# Patient Record
Sex: MALE | Race: OTHER | HISPANIC OR LATINO | URBAN - METROPOLITAN AREA
[De-identification: names, ages, dates, MRNs, and addresses within clinical notes are randomized per-mention and may not be internally consistent; named-entity substitution may affect disease eponyms.]

---

## 2017-10-19 ENCOUNTER — EMERGENCY (EMERGENCY)
Facility: HOSPITAL | Age: 57
LOS: 1 days | Discharge: HOME | End: 2017-10-19
Attending: EMERGENCY MEDICINE
Payer: MEDICAID

## 2017-10-19 VITALS
TEMPERATURE: 97 F | WEIGHT: 147.93 LBS | RESPIRATION RATE: 16 BRPM | SYSTOLIC BLOOD PRESSURE: 137 MMHG | DIASTOLIC BLOOD PRESSURE: 83 MMHG | HEART RATE: 64 BPM | OXYGEN SATURATION: 97 %

## 2017-10-19 DIAGNOSIS — T22.219A BURN OF SECOND DEGREE OF UNSPECIFIED FOREARM, INITIAL ENCOUNTER: ICD-10-CM
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
## 2017-10-19 DIAGNOSIS — T23.271A BURN OF SECOND DEGREE OF RIGHT WRIST, INITIAL ENCOUNTER: ICD-10-CM
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
## 2017-10-19 DIAGNOSIS — T31.0 BURNS INVOLVING LESS THAN 10% OF BODY SURFACE: ICD-10-CM

## 2017-10-19 PROCEDURE — 99284 EMERGENCY DEPT VISIT MOD MDM: CPT

## 2017-10-19 PROCEDURE — 99283 EMERGENCY DEPT VISIT LOW MDM: CPT

## 2017-10-19 RX ORDER — FLUORESCEIN SODIUM 9 MG
1 STRIP OPHTHALMIC (EYE) ONCE
Qty: 0 | Refills: 0 | Status: COMPLETED | OUTPATIENT
Start: 2017-10-19 | End: 2017-10-19

## 2017-10-19 RX ORDER — CIPROFLOXACIN HCL 0.3 %
1 DROPS OPHTHALMIC (EYE) ONCE
Qty: 0 | Refills: 0 | Status: COMPLETED | OUTPATIENT
Start: 2017-10-19 | End: 2017-10-19

## 2017-10-19 RX ADMIN — Medication 1 APPLICATION(S): at 21:40

## 2017-10-19 RX ADMIN — Medication 1 DROP(S): at 21:51

## 2017-10-19 RX ADMIN — Medication 1 APPLICATION(S): at 21:51

## 2017-10-19 RX ADMIN — Medication 1 DROP(S): at 21:40
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
## 2017-10-19 NOTE — ED PROVIDER NOTE - MUSCULOSKELETAL, MLM
Spine appears normal, range of motion is not limited, no muscle or joint tenderness, Distal motor, sensory, and pulses intact.
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
## 2017-10-22 ENCOUNTER — EMERGENCY (EMERGENCY)
Facility: HOSPITAL | Age: 57
LOS: 1 days | Discharge: HOME | End: 2017-10-22
Attending: EMERGENCY MEDICINE
Payer: SELF-PAY

## 2017-10-22 VITALS
HEART RATE: 78 BPM | TEMPERATURE: 98 F | HEIGHT: 67 IN | DIASTOLIC BLOOD PRESSURE: 91 MMHG | OXYGEN SATURATION: 98 % | SYSTOLIC BLOOD PRESSURE: 121 MMHG | WEIGHT: 145.06 LBS | RESPIRATION RATE: 16 BRPM
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
## 2017-10-22 DIAGNOSIS — T23.001D BURN OF UNSPECIFIED DEGREE OF RIGHT HAND, UNSPECIFIED SITE, SUBSEQUENT ENCOUNTER: ICD-10-CM
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
## 2017-10-22 DIAGNOSIS — Z48.00 ENCOUNTER FOR CHANGE OR REMOVAL OF NONSURGICAL WOUND DRESSING: ICD-10-CM

## 2017-10-22 PROCEDURE — 99284 EMERGENCY DEPT VISIT MOD MDM: CPT

## 2017-10-22 PROCEDURE — 99283 EMERGENCY DEPT VISIT LOW MDM: CPT
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
## 2017-10-22 NOTE — ED PROVIDER NOTE - MEDICAL DECISION MAKING DETAILS
58 y/o M pt ran out of Silvadene cream. Will re-order Silvadene cream and d/c home. No need for Abx's.
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
## 2018-07-26 ENCOUNTER — INPATIENT (INPATIENT)
Facility: HOSPITAL | Age: 58
LOS: 2 days | Discharge: HOME | DRG: 416 | End: 2018-07-29
Attending: SURGERY | Admitting: SURGERY
Payer: MEDICAID

## 2018-07-26 ENCOUNTER — TRANSCRIPTION ENCOUNTER (OUTPATIENT)
Age: 58
End: 2018-07-26

## 2018-07-26 VITALS
HEART RATE: 85 BPM | TEMPERATURE: 99 F | RESPIRATION RATE: 20 BRPM | OXYGEN SATURATION: 100 % | DIASTOLIC BLOOD PRESSURE: 86 MMHG | SYSTOLIC BLOOD PRESSURE: 147 MMHG

## 2018-07-26 DIAGNOSIS — K80.00 CALCULUS OF GALLBLADDER WITH ACUTE CHOLECYSTITIS WITHOUT OBSTRUCTION: ICD-10-CM

## 2018-07-26 DIAGNOSIS — E87.6 HYPOKALEMIA: ICD-10-CM

## 2018-07-26 DIAGNOSIS — Z29.9 ENCOUNTER FOR PROPHYLACTIC MEASURES, UNSPECIFIED: ICD-10-CM

## 2018-07-26 DIAGNOSIS — K81.0 ACUTE CHOLECYSTITIS: ICD-10-CM

## 2018-07-26 LAB
ALBUMIN SERPL ELPH-MCNC: 3.4 G/DL — LOW (ref 3.5–5)
ALP SERPL-CCNC: 151 U/L — HIGH (ref 40–120)
ALT FLD-CCNC: 87 U/L DA — HIGH (ref 10–60)
ANION GAP SERPL CALC-SCNC: 6 MMOL/L — SIGNIFICANT CHANGE UP (ref 5–17)
APPEARANCE UR: CLEAR — SIGNIFICANT CHANGE UP
APTT BLD: 30.8 SEC — SIGNIFICANT CHANGE UP (ref 27.5–37.4)
AST SERPL-CCNC: 47 U/L — HIGH (ref 10–40)
BASOPHILS # BLD AUTO: 0 K/UL — SIGNIFICANT CHANGE UP (ref 0–0.2)
BASOPHILS NFR BLD AUTO: 0.3 % — SIGNIFICANT CHANGE UP (ref 0–2)
BILIRUB SERPL-MCNC: 2.6 MG/DL — HIGH (ref 0.2–1.2)
BILIRUB UR-MCNC: NEGATIVE — SIGNIFICANT CHANGE UP
BUN SERPL-MCNC: 8 MG/DL — SIGNIFICANT CHANGE UP (ref 7–18)
CALCIUM SERPL-MCNC: 8.4 MG/DL — SIGNIFICANT CHANGE UP (ref 8.4–10.5)
CHLORIDE SERPL-SCNC: 101 MMOL/L — SIGNIFICANT CHANGE UP (ref 96–108)
CO2 SERPL-SCNC: 30 MMOL/L — SIGNIFICANT CHANGE UP (ref 22–31)
COLOR SPEC: ABNORMAL
CREAT SERPL-MCNC: 0.7 MG/DL — SIGNIFICANT CHANGE UP (ref 0.5–1.3)
DIFF PNL FLD: ABNORMAL
EOSINOPHIL # BLD AUTO: 0 K/UL — SIGNIFICANT CHANGE UP (ref 0–0.5)
EOSINOPHIL NFR BLD AUTO: 0.2 % — SIGNIFICANT CHANGE UP (ref 0–6)
GLUCOSE SERPL-MCNC: 111 MG/DL — HIGH (ref 70–99)
GLUCOSE UR QL: NEGATIVE — SIGNIFICANT CHANGE UP
HCT VFR BLD CALC: 43.7 % — SIGNIFICANT CHANGE UP (ref 39–50)
HGB BLD-MCNC: 15 G/DL — SIGNIFICANT CHANGE UP (ref 13–17)
INR BLD: 1.15 RATIO — SIGNIFICANT CHANGE UP (ref 0.88–1.16)
KETONES UR-MCNC: ABNORMAL
LEUKOCYTE ESTERASE UR-ACNC: ABNORMAL
LIDOCAIN IGE QN: 76 U/L — SIGNIFICANT CHANGE UP (ref 73–393)
LYMPHOCYTES # BLD AUTO: 1.5 K/UL — SIGNIFICANT CHANGE UP (ref 1–3.3)
LYMPHOCYTES # BLD AUTO: 15.2 % — SIGNIFICANT CHANGE UP (ref 13–44)
MCHC RBC-ENTMCNC: 31.9 PG — SIGNIFICANT CHANGE UP (ref 27–34)
MCHC RBC-ENTMCNC: 34.2 GM/DL — SIGNIFICANT CHANGE UP (ref 32–36)
MCV RBC AUTO: 93.3 FL — SIGNIFICANT CHANGE UP (ref 80–100)
MONOCYTES # BLD AUTO: 0.9 K/UL — SIGNIFICANT CHANGE UP (ref 0–0.9)
MONOCYTES NFR BLD AUTO: 8.7 % — SIGNIFICANT CHANGE UP (ref 2–14)
NEUTROPHILS # BLD AUTO: 7.7 K/UL — HIGH (ref 1.8–7.4)
NEUTROPHILS NFR BLD AUTO: 75.6 % — SIGNIFICANT CHANGE UP (ref 43–77)
NITRITE UR-MCNC: NEGATIVE — SIGNIFICANT CHANGE UP
PH UR: 7 — SIGNIFICANT CHANGE UP (ref 5–8)
PLATELET # BLD AUTO: 218 K/UL — SIGNIFICANT CHANGE UP (ref 150–400)
POTASSIUM SERPL-MCNC: 3.1 MMOL/L — LOW (ref 3.5–5.3)
POTASSIUM SERPL-SCNC: 3.1 MMOL/L — LOW (ref 3.5–5.3)
PROT SERPL-MCNC: 7.2 G/DL — SIGNIFICANT CHANGE UP (ref 6–8.3)
PROT UR-MCNC: 30 MG/DL
PROTHROM AB SERPL-ACNC: 12.6 SEC — SIGNIFICANT CHANGE UP (ref 9.8–12.7)
RBC # BLD: 4.69 M/UL — SIGNIFICANT CHANGE UP (ref 4.2–5.8)
RBC # FLD: 11.2 % — SIGNIFICANT CHANGE UP (ref 10.3–14.5)
SODIUM SERPL-SCNC: 137 MMOL/L — SIGNIFICANT CHANGE UP (ref 135–145)
SP GR SPEC: 1.01 — SIGNIFICANT CHANGE UP (ref 1.01–1.02)
UROBILINOGEN FLD QL: 12
WBC # BLD: 10.1 K/UL — SIGNIFICANT CHANGE UP (ref 3.8–10.5)
WBC # FLD AUTO: 10.1 K/UL — SIGNIFICANT CHANGE UP (ref 3.8–10.5)

## 2018-07-26 PROCEDURE — 74177 CT ABD & PELVIS W/CONTRAST: CPT | Mod: 26

## 2018-07-26 PROCEDURE — 93010 ELECTROCARDIOGRAM REPORT: CPT

## 2018-07-26 PROCEDURE — 76705 ECHO EXAM OF ABDOMEN: CPT | Mod: 26

## 2018-07-26 PROCEDURE — 99222 1ST HOSP IP/OBS MODERATE 55: CPT | Mod: 57

## 2018-07-26 PROCEDURE — 99285 EMERGENCY DEPT VISIT HI MDM: CPT

## 2018-07-26 RX ORDER — SODIUM CHLORIDE 9 MG/ML
3 INJECTION INTRAMUSCULAR; INTRAVENOUS; SUBCUTANEOUS ONCE
Qty: 0 | Refills: 0 | Status: COMPLETED | OUTPATIENT
Start: 2018-07-26 | End: 2018-07-26

## 2018-07-26 RX ORDER — HEPARIN SODIUM 5000 [USP'U]/ML
5000 INJECTION INTRAVENOUS; SUBCUTANEOUS EVERY 8 HOURS
Qty: 0 | Refills: 0 | Status: DISCONTINUED | OUTPATIENT
Start: 2018-07-26 | End: 2018-07-27

## 2018-07-26 RX ORDER — ONDANSETRON 8 MG/1
4 TABLET, FILM COATED ORAL EVERY 6 HOURS
Qty: 0 | Refills: 0 | Status: DISCONTINUED | OUTPATIENT
Start: 2018-07-26 | End: 2018-07-29

## 2018-07-26 RX ORDER — KETOROLAC TROMETHAMINE 30 MG/ML
30 SYRINGE (ML) INJECTION ONCE
Qty: 0 | Refills: 0 | Status: DISCONTINUED | OUTPATIENT
Start: 2018-07-26 | End: 2018-07-26

## 2018-07-26 RX ORDER — MORPHINE SULFATE 50 MG/1
2 CAPSULE, EXTENDED RELEASE ORAL EVERY 6 HOURS
Qty: 0 | Refills: 0 | Status: DISCONTINUED | OUTPATIENT
Start: 2018-07-26 | End: 2018-07-29

## 2018-07-26 RX ORDER — ONDANSETRON 8 MG/1
4 TABLET, FILM COATED ORAL ONCE
Qty: 0 | Refills: 0 | Status: COMPLETED | OUTPATIENT
Start: 2018-07-26 | End: 2018-07-26

## 2018-07-26 RX ORDER — SODIUM CHLORIDE 9 MG/ML
1000 INJECTION INTRAMUSCULAR; INTRAVENOUS; SUBCUTANEOUS ONCE
Qty: 0 | Refills: 0 | Status: COMPLETED | OUTPATIENT
Start: 2018-07-26 | End: 2018-07-26

## 2018-07-26 RX ORDER — SODIUM CHLORIDE 9 MG/ML
1000 INJECTION INTRAMUSCULAR; INTRAVENOUS; SUBCUTANEOUS
Qty: 0 | Refills: 0 | Status: DISCONTINUED | OUTPATIENT
Start: 2018-07-26 | End: 2018-07-29

## 2018-07-26 RX ORDER — POTASSIUM CHLORIDE 20 MEQ
40 PACKET (EA) ORAL ONCE
Qty: 0 | Refills: 0 | Status: COMPLETED | OUTPATIENT
Start: 2018-07-26 | End: 2018-07-26

## 2018-07-26 RX ORDER — MORPHINE SULFATE 50 MG/1
4 CAPSULE, EXTENDED RELEASE ORAL ONCE
Qty: 0 | Refills: 0 | Status: DISCONTINUED | OUTPATIENT
Start: 2018-07-26 | End: 2018-07-26

## 2018-07-26 RX ADMIN — Medication 30 MILLIGRAM(S): at 11:11

## 2018-07-26 RX ADMIN — MORPHINE SULFATE 4 MILLIGRAM(S): 50 CAPSULE, EXTENDED RELEASE ORAL at 20:32

## 2018-07-26 RX ADMIN — SODIUM CHLORIDE 3 MILLILITER(S): 9 INJECTION INTRAMUSCULAR; INTRAVENOUS; SUBCUTANEOUS at 11:06

## 2018-07-26 RX ADMIN — Medication 30 MILLIGRAM(S): at 13:00

## 2018-07-26 RX ADMIN — Medication 40 MILLIEQUIVALENT(S): at 16:22

## 2018-07-26 RX ADMIN — SODIUM CHLORIDE 1000 MILLILITER(S): 9 INJECTION INTRAMUSCULAR; INTRAVENOUS; SUBCUTANEOUS at 13:00

## 2018-07-26 RX ADMIN — SODIUM CHLORIDE 1000 MILLILITER(S): 9 INJECTION INTRAMUSCULAR; INTRAVENOUS; SUBCUTANEOUS at 11:11

## 2018-07-26 RX ADMIN — MORPHINE SULFATE 4 MILLIGRAM(S): 50 CAPSULE, EXTENDED RELEASE ORAL at 20:03

## 2018-07-26 RX ADMIN — ONDANSETRON 4 MILLIGRAM(S): 8 TABLET, FILM COATED ORAL at 11:11

## 2018-07-26 NOTE — ED PROVIDER NOTE - OBJECTIVE STATEMENT
58 year old M Pt w/ no PMHx presents to ED c/o periumbilical and RUQ abdominal pain since Sunday night. Pt reports eating pork Sunday afternoon, and that night had sudden onset of abdominal pain lasting 2 hours. Pain spontaneously resolved but recurred Tuesday and Wednesday. Pt reports since last night, pain has been constant, one episode of vomiting. Pt denies fever, chills, sweats, hematuria, dysuria, diarrhea, and all other complaints.

## 2018-07-26 NOTE — H&P ADULT - HISTORY OF PRESENT ILLNESS
57 y/o M with no significant PMH presents with 4 days of 8/10 intermittent abdominal pain in the RUQ first experienced after eating pork this past Sunday and was associated with nausea. Pain has been constant since yesterday with one episode of vomiting. Current pain 3/10. Has not had bowel movement in 4 days. Denies fever, chills, HA, diarrhea, CP, SOB. Denies prior history of gallbladder disease and cholelithiasis.

## 2018-07-26 NOTE — ED PROVIDER NOTE - PHYSICAL EXAMINATION
General: pt lying in stretcher, appears stated age and is not in distress  HEENT: AT/NC, pink conjunctiva, anicteric sclerae, EOMI, PERRLA, TMs smooth, grey, intact, with normal landmarks, nasal mucosa pink, no discharge, turbinates not enlarged; moist mucus membranes, tongue well-papillated, good dentition; posterior pharynx shows no erythema or exudates;   Neck: supple, full ROM, trachea midline, no JVD, no cervical LAD, no midline ttp or stepoffs  Lungs: symmetric excursion, b/l clear vesicular breath sounds with no wheezes, crackles, or rhonchi  Heart: rrr, S1, S2 normal; no S3 or S4; no murmurs or rubs  Abd: normal bowel sounds; soft, nontender; negative McBurney's point tenderness, negative Turner's sign, no rebound, no guarding, spleen non-palpable; no hepatomegaly, no masses  Back: no midline spinal tenderness or stepoffs, no costovertebral angle tenderness  Extremities: no clubbing, cyanosis, or edema; no palpable deformities or fractures  Skin: good turgor; no rashes, petechiae, ecchymoses, or jaundice  Pulses: radial, posterior tibialis (PT), dorsalis pedis (DP) all 2+ & symmetric  Neuro: awake, alert, responsive; oriented to person, place and time; cranial nerves intact, EOMI, intact jaw movement, intact facial sensation, no facial asymmetry, hearing intact; no nystagmus, tongue midline; Motor: Normal tone in upper and lower extremities bilaterally strength 5/5; Sensory: intact to pinprick and light touch; Cerebellar: finger-to-nose intact; normal steady gait; negative Romberg’s sign; DTR: biceps, triceps, patellar, 2+, no pronator drift General: pt lying in stretcher, appears stated age and is uncomfortable secondary to pain  HEENT: AT/NC, pink conjunctiva, anicteric sclerae, EOMI, PERRLA, moist mucus membranes, tongue well-papillated, posterior pharynx shows no erythema or exudates;   Neck: supple, full ROM, trachea midline, no JVD, no cervical LAD, no midline ttp or stepoffs  Lungs: symmetric excursion, b/l clear vesicular breath sounds with no wheezes, crackles, or rhonchi  Heart: rrr, S1, S2 normal; no S3 or S4; no murmurs or rubs  Abd: normal bowel sounds; soft, +RUQ tenderness to palpation w/ guarding, negative McBurney's point tenderness, negative Turner's sign, no rebound, spleen non-palpable; no hepatomegaly, no masses  Back: no midline spinal tenderness or stepoffs, no costovertebral angle tenderness  Extremities: no clubbing, cyanosis, or edema; no palpable deformities or fractures  Skin: good turgor; no rashes, petechiae, ecchymoses, or jaundice  Pulses: radial, posterior tibialis (PT), dorsalis pedis (DP) all 2+ & symmetric  Neuro: awake, alert, responsive; oriented to person, place and time; cranial nerves intact, EOMI, intact jaw movement, intact facial sensation, no facial asymmetry, hearing intact; tongue midline; Motor: Normal tone in upper and lower extremities bilaterally strength 5/5; Sensory: intact; normal steady gait

## 2018-07-26 NOTE — H&P ADULT - NSHPLABSRESULTS_GEN_ALL_CORE
LABS:                        15.0   10.1  )-----------( 218      ( 2018 11:42 )             43.7         137  |  101  |  8   ----------------------------<  111<H>  3.1<L>   |  30  |  0.70    Ca    8.4      2018 11:42    TPro  7.2  /  Alb  3.4<L>  /  TBili  2.6<H>  /  DBili  x   /  AST  47<H>  /  ALT  87<H>  /  AlkPhos  151<H>      Urinalysis Basic - ( 2018 11:42 )    Color: Celestina / Appearance: Clear / S.010 / pH: x  Gluc: x / Ketone: Trace  / Bili: Negative / Urobili: 12   Blood: x / Protein: 30 mg/dL / Nitrite: Negative   Leuk Esterase: Trace / RBC: 2-5 /HPF / WBC 0-2 /HPF   Sq Epi: x / Non Sq Epi: Occasional /HPF / Bacteria: Trace /HPF    RADIOLOGY & ADDITIONAL STUDIES:  < from: CT Abdomen and Pelvis w/ IV Cont (18 @ 12:11) >    IMPRESSION:  Distended gallbladder with mild surrounding fat stranding. No calcified   gallstone. No biliary ductal dilatation. Consider further evaluation with   ultrasound to exclude cholecystitis, as clinically indicated.    Enlarged prostate.    Other findings as above.    < end of copied text >    < from: US Hepatic & Pancreatic (18 @ 16:28) >    Impression: Dependent sludge in the distended gallbladder. 1.5 cm   shadowing gallstone in the gallbladder neck. Slight gallbladder wall  thickening. No evidence for pericholecystic fluid or ultrasonic Turner's   sign. Findings are equivocal for acute cholecystitis. If clinically   indicated, HIDA scan may be pursued for further evaluation.    Hepatic steatosis.    < end of copied text >

## 2018-07-26 NOTE — ED PROVIDER NOTE - MEDICAL DECISION MAKING DETAILS
59 yo m w/ aforementioned presentation concerning for hepatobiliary disease versus electrolyte abnormalities versus infection versus other cause of acute abdomen. will get labs, CT, US, give pain medication, monitor and reassess. Pt pending surgery consult for cholecystitis. Signed out to Dr. Curiel.

## 2018-07-26 NOTE — H&P ADULT - NSHPPHYSICALEXAM_GEN_ALL_CORE
Vital Signs Last 24 Hrs  T(C): 37.4 (26 Jul 2018 16:08), Max: 37.4 (26 Jul 2018 16:08)  T(F): 99.4 (26 Jul 2018 16:08), Max: 99.4 (26 Jul 2018 16:08)  HR: 59 (26 Jul 2018 16:08) (59 - 85)  BP: 127/76 (26 Jul 2018 16:08) (117/71 - 147/86)  RR: 16 (26 Jul 2018 16:08) (16 - 20)  SpO2: 98% (26 Jul 2018 16:08) (98% - 100%)    Physical Exam:    General:  Appears stated age, well-groomed, well-nourished, no distress  Eyes: EOMI  Chest:  Respirations non-labored  Abdomen:  Moderately distended abdomen. Tenderness localized to RUQ and epigastric area. No guarding or rebound tenderness.  Negative Turner's sign.  Skin:  Warm and dry  Neuro:  Alert, oriented to time, place and person  Psych: Normal affect

## 2018-07-26 NOTE — H&P ADULT - PROBLEM SELECTOR PLAN 1
NPO. IV Fluids  Schedule for laparoscopic cholecystectomy tomorrow  preop labs pending  EKG pending  repeat labs/LFT's in AM  pain control PRN, antiemetic PRN

## 2018-07-27 ENCOUNTER — RESULT REVIEW (OUTPATIENT)
Age: 58
End: 2018-07-27

## 2018-07-27 LAB
ALBUMIN SERPL ELPH-MCNC: 3.2 G/DL — LOW (ref 3.5–5)
ALP SERPL-CCNC: 179 U/L — HIGH (ref 40–120)
ALT FLD-CCNC: 78 U/L DA — HIGH (ref 10–60)
ANION GAP SERPL CALC-SCNC: 8 MMOL/L — SIGNIFICANT CHANGE UP (ref 5–17)
AST SERPL-CCNC: 36 U/L — SIGNIFICANT CHANGE UP (ref 10–40)
BILIRUB SERPL-MCNC: 2.5 MG/DL — HIGH (ref 0.2–1.2)
BUN SERPL-MCNC: 12 MG/DL — SIGNIFICANT CHANGE UP (ref 7–18)
CALCIUM SERPL-MCNC: 8.5 MG/DL — SIGNIFICANT CHANGE UP (ref 8.4–10.5)
CHLORIDE SERPL-SCNC: 105 MMOL/L — SIGNIFICANT CHANGE UP (ref 96–108)
CO2 SERPL-SCNC: 25 MMOL/L — SIGNIFICANT CHANGE UP (ref 22–31)
CREAT SERPL-MCNC: 0.68 MG/DL — SIGNIFICANT CHANGE UP (ref 0.5–1.3)
GLUCOSE SERPL-MCNC: 115 MG/DL — HIGH (ref 70–99)
HCT VFR BLD CALC: 43.2 % — SIGNIFICANT CHANGE UP (ref 39–50)
HGB BLD-MCNC: 14.7 G/DL — SIGNIFICANT CHANGE UP (ref 13–17)
MCHC RBC-ENTMCNC: 32.2 PG — SIGNIFICANT CHANGE UP (ref 27–34)
MCHC RBC-ENTMCNC: 34 GM/DL — SIGNIFICANT CHANGE UP (ref 32–36)
MCV RBC AUTO: 94.9 FL — SIGNIFICANT CHANGE UP (ref 80–100)
PLATELET # BLD AUTO: 257 K/UL — SIGNIFICANT CHANGE UP (ref 150–400)
POTASSIUM SERPL-MCNC: 3.8 MMOL/L — SIGNIFICANT CHANGE UP (ref 3.5–5.3)
POTASSIUM SERPL-SCNC: 3.8 MMOL/L — SIGNIFICANT CHANGE UP (ref 3.5–5.3)
PROT SERPL-MCNC: 7.4 G/DL — SIGNIFICANT CHANGE UP (ref 6–8.3)
RBC # BLD: 4.55 M/UL — SIGNIFICANT CHANGE UP (ref 4.2–5.8)
RBC # FLD: 11.4 % — SIGNIFICANT CHANGE UP (ref 10.3–14.5)
SODIUM SERPL-SCNC: 138 MMOL/L — SIGNIFICANT CHANGE UP (ref 135–145)
WBC # BLD: 9.3 K/UL — SIGNIFICANT CHANGE UP (ref 3.8–10.5)
WBC # FLD AUTO: 9.3 K/UL — SIGNIFICANT CHANGE UP (ref 3.8–10.5)

## 2018-07-27 PROCEDURE — 47605 CHOLECYSTECTOMY W/CHOLANG: CPT

## 2018-07-27 PROCEDURE — 88304 TISSUE EXAM BY PATHOLOGIST: CPT | Mod: 26

## 2018-07-27 RX ORDER — ACETAMINOPHEN 500 MG
1000 TABLET ORAL ONCE
Qty: 0 | Refills: 0 | Status: COMPLETED | OUTPATIENT
Start: 2018-07-28 | End: 2018-07-28

## 2018-07-27 RX ORDER — ACETAMINOPHEN 500 MG
1000 TABLET ORAL ONCE
Qty: 0 | Refills: 0 | Status: COMPLETED | OUTPATIENT
Start: 2018-07-27 | End: 2018-07-27

## 2018-07-27 RX ORDER — CEFOTETAN DISODIUM 1 G
2 VIAL (EA) INJECTION EVERY 12 HOURS
Qty: 0 | Refills: 0 | Status: DISCONTINUED | OUTPATIENT
Start: 2018-07-27 | End: 2018-07-29

## 2018-07-27 RX ORDER — HEPARIN SODIUM 5000 [USP'U]/ML
5000 INJECTION INTRAVENOUS; SUBCUTANEOUS EVERY 8 HOURS
Qty: 0 | Refills: 0 | Status: DISCONTINUED | OUTPATIENT
Start: 2018-07-28 | End: 2018-07-29

## 2018-07-27 RX ORDER — OXYCODONE AND ACETAMINOPHEN 5; 325 MG/1; MG/1
1 TABLET ORAL EVERY 4 HOURS
Qty: 0 | Refills: 0 | Status: DISCONTINUED | OUTPATIENT
Start: 2018-07-27 | End: 2018-07-29

## 2018-07-27 RX ORDER — HYDROMORPHONE HYDROCHLORIDE 2 MG/ML
1 INJECTION INTRAMUSCULAR; INTRAVENOUS; SUBCUTANEOUS EVERY 6 HOURS
Qty: 0 | Refills: 0 | Status: DISCONTINUED | OUTPATIENT
Start: 2018-07-27 | End: 2018-07-29

## 2018-07-27 RX ADMIN — Medication 400 MILLIGRAM(S): at 15:27

## 2018-07-27 RX ADMIN — Medication 1000 MILLIGRAM(S): at 21:20

## 2018-07-27 RX ADMIN — HEPARIN SODIUM 5000 UNIT(S): 5000 INJECTION INTRAVENOUS; SUBCUTANEOUS at 00:11

## 2018-07-27 RX ADMIN — Medication 1000 MILLIGRAM(S): at 15:42

## 2018-07-27 RX ADMIN — HEPARIN SODIUM 5000 UNIT(S): 5000 INJECTION INTRAVENOUS; SUBCUTANEOUS at 05:47

## 2018-07-27 RX ADMIN — MORPHINE SULFATE 2 MILLIGRAM(S): 50 CAPSULE, EXTENDED RELEASE ORAL at 17:40

## 2018-07-27 RX ADMIN — HYDROMORPHONE HYDROCHLORIDE 1 MILLIGRAM(S): 2 INJECTION INTRAMUSCULAR; INTRAVENOUS; SUBCUTANEOUS at 18:00

## 2018-07-27 RX ADMIN — HYDROMORPHONE HYDROCHLORIDE 1 MILLIGRAM(S): 2 INJECTION INTRAMUSCULAR; INTRAVENOUS; SUBCUTANEOUS at 17:43

## 2018-07-27 RX ADMIN — MORPHINE SULFATE 2 MILLIGRAM(S): 50 CAPSULE, EXTENDED RELEASE ORAL at 15:58

## 2018-07-27 RX ADMIN — Medication 100 GRAM(S): at 20:52

## 2018-07-27 RX ADMIN — Medication 400 MILLIGRAM(S): at 20:49

## 2018-07-27 RX ADMIN — MORPHINE SULFATE 2 MILLIGRAM(S): 50 CAPSULE, EXTENDED RELEASE ORAL at 05:46

## 2018-07-27 RX ADMIN — MORPHINE SULFATE 2 MILLIGRAM(S): 50 CAPSULE, EXTENDED RELEASE ORAL at 03:29

## 2018-07-27 RX ADMIN — SODIUM CHLORIDE 125 MILLILITER(S): 9 INJECTION INTRAMUSCULAR; INTRAVENOUS; SUBCUTANEOUS at 03:30

## 2018-07-27 NOTE — PROGRESS NOTE ADULT - SUBJECTIVE AND OBJECTIVE BOX
SUBJECTIVE    Nausea [X ] YES [ ] NO  Vomiting [ ] YES [X ] NO  Voiding normally [X ] YES [ ] NO  Flatus [ ] YES X[ ] NO  BM [ ] YES [X ] NO  Pain under control [X ] YES [ ] NO  NPO  Ambulated [X ] YES NO [ ]        VITALS    ICU Vital Signs Last 24 Hrs  T(C): 36.7 (27 Jul 2018 05:35), Max: 37.4 (26 Jul 2018 16:08)  T(F): 98 (27 Jul 2018 05:35), Max: 99.4 (26 Jul 2018 16:08)  HR: 71 (27 Jul 2018 05:35) (59 - 85)  BP: 121/74 (27 Jul 2018 05:35) (117/71 - 147/86)  BP(mean): --  ABP: --  ABP(mean): --  RR: 18 (27 Jul 2018 05:35) (16 - 20)  SpO2: 98% (27 Jul 2018 05:35) (95% - 100%)    I&O's Detail        PHYSICAL EXAMINATION    Abdomen: soft, ND, mildly tender to RUQ but was medicated recently    I&O's Summary      LABS                        15.0   10.1  )-----------( 218      ( 26 Jul 2018 11:42 )             43.7             07-26    137  |  101  |  8   ----------------------------<  111<H>  3.1<L>   |  30  |  0.70    Ca    8.4      26 Jul 2018 11:42    TPro  7.2  /  Alb  3.4<L>  /  TBili  2.6<H>  /  DBili  x   /  AST  47<H>  /  ALT  87<H>  /  AlkPhos  151<H>  07-26    LIVER FUNCTIONS - ( 26 Jul 2018 11:42 )  Alb: 3.4 g/dL / Pro: 7.2 g/dL / ALK PHOS: 151 U/L / ALT: 87 U/L DA / AST: 47 U/L / GGT: x             MEDICATIONS:  MEDICATIONS  (STANDING):  heparin  Injectable 5000 Unit(s) SubCutaneous every 8 hours  sodium chloride 0.9%. 1000 milliLiter(s) (125 mL/Hr) IV Continuous <Continuous>    MEDICATIONS  (PRN):  morphine  - Injectable 2 milliGRAM(s) IV Push every 6 hours PRN Severe Pain (7 - 10)  ondansetron Injectable 4 milliGRAM(s) IV Push every 6 hours PRN Nausea and/or Vomiting

## 2018-07-27 NOTE — PROGRESS NOTE ADULT - ASSESSMENT
58M with acute choplecystitis. Lap josh this admission. Risks, benefits, alternatives discussed, all questions answered, agrees to proceed.    1) continue IVF  2) abx  3) T&S  4) OR

## 2018-07-27 NOTE — PROGRESS NOTE ADULT - ASSESSMENT
S/P open cholecystectomy, IOC    Hemodynamically stable    -OOB, IS  -CLD  -Pain management  -Dressing change in AM  -GI/DVT prophylaxis

## 2018-07-27 NOTE — BRIEF OPERATIVE NOTE - PROCEDURE
<<-----Click on this checkbox to enter Procedure Open cholecystectomy with cholangiography  07/27/2018    Active  CFUNFGELD  Laparoscopy  07/27/2018    Active  CFUNFGELD

## 2018-07-28 LAB
ALBUMIN SERPL ELPH-MCNC: 2.3 G/DL — LOW (ref 3.5–5)
ALP SERPL-CCNC: 156 U/L — HIGH (ref 40–120)
ALT FLD-CCNC: 100 U/L DA — HIGH (ref 10–60)
ANION GAP SERPL CALC-SCNC: 4 MMOL/L — LOW (ref 5–17)
AST SERPL-CCNC: 83 U/L — HIGH (ref 10–40)
BASOPHILS # BLD AUTO: 0 K/UL — SIGNIFICANT CHANGE UP (ref 0–0.2)
BASOPHILS NFR BLD AUTO: 0.5 % — SIGNIFICANT CHANGE UP (ref 0–2)
BILIRUB SERPL-MCNC: 1.3 MG/DL — HIGH (ref 0.2–1.2)
BUN SERPL-MCNC: 11 MG/DL — SIGNIFICANT CHANGE UP (ref 7–18)
CALCIUM SERPL-MCNC: 7.6 MG/DL — LOW (ref 8.4–10.5)
CHLORIDE SERPL-SCNC: 105 MMOL/L — SIGNIFICANT CHANGE UP (ref 96–108)
CO2 SERPL-SCNC: 29 MMOL/L — SIGNIFICANT CHANGE UP (ref 22–31)
CREAT SERPL-MCNC: 0.66 MG/DL — SIGNIFICANT CHANGE UP (ref 0.5–1.3)
EOSINOPHIL # BLD AUTO: 0 K/UL — SIGNIFICANT CHANGE UP (ref 0–0.5)
EOSINOPHIL NFR BLD AUTO: 0.1 % — SIGNIFICANT CHANGE UP (ref 0–6)
GLUCOSE SERPL-MCNC: 97 MG/DL — SIGNIFICANT CHANGE UP (ref 70–99)
HCT VFR BLD CALC: 34.8 % — LOW (ref 39–50)
HGB BLD-MCNC: 11.9 G/DL — LOW (ref 13–17)
LYMPHOCYTES # BLD AUTO: 1.2 K/UL — SIGNIFICANT CHANGE UP (ref 1–3.3)
LYMPHOCYTES # BLD AUTO: 18.2 % — SIGNIFICANT CHANGE UP (ref 13–44)
MCHC RBC-ENTMCNC: 32.3 PG — SIGNIFICANT CHANGE UP (ref 27–34)
MCHC RBC-ENTMCNC: 34.2 GM/DL — SIGNIFICANT CHANGE UP (ref 32–36)
MCV RBC AUTO: 94.5 FL — SIGNIFICANT CHANGE UP (ref 80–100)
MONOCYTES # BLD AUTO: 0.5 K/UL — SIGNIFICANT CHANGE UP (ref 0–0.9)
MONOCYTES NFR BLD AUTO: 7.2 % — SIGNIFICANT CHANGE UP (ref 2–14)
NEUTROPHILS # BLD AUTO: 4.8 K/UL — SIGNIFICANT CHANGE UP (ref 1.8–7.4)
NEUTROPHILS NFR BLD AUTO: 74 % — SIGNIFICANT CHANGE UP (ref 43–77)
PLATELET # BLD AUTO: 210 K/UL — SIGNIFICANT CHANGE UP (ref 150–400)
POTASSIUM SERPL-MCNC: 3.6 MMOL/L — SIGNIFICANT CHANGE UP (ref 3.5–5.3)
POTASSIUM SERPL-SCNC: 3.6 MMOL/L — SIGNIFICANT CHANGE UP (ref 3.5–5.3)
PROT SERPL-MCNC: 5.6 G/DL — LOW (ref 6–8.3)
RBC # BLD: 3.68 M/UL — LOW (ref 4.2–5.8)
RBC # FLD: 11.2 % — SIGNIFICANT CHANGE UP (ref 10.3–14.5)
SODIUM SERPL-SCNC: 138 MMOL/L — SIGNIFICANT CHANGE UP (ref 135–145)
WBC # BLD: 6.5 K/UL — SIGNIFICANT CHANGE UP (ref 3.8–10.5)
WBC # FLD AUTO: 6.5 K/UL — SIGNIFICANT CHANGE UP (ref 3.8–10.5)

## 2018-07-28 RX ADMIN — Medication 400 MILLIGRAM(S): at 13:21

## 2018-07-28 RX ADMIN — Medication 1000 MILLIGRAM(S): at 09:15

## 2018-07-28 RX ADMIN — Medication 1000 MILLIGRAM(S): at 02:35

## 2018-07-28 RX ADMIN — HEPARIN SODIUM 5000 UNIT(S): 5000 INJECTION INTRAVENOUS; SUBCUTANEOUS at 16:00

## 2018-07-28 RX ADMIN — HEPARIN SODIUM 5000 UNIT(S): 5000 INJECTION INTRAVENOUS; SUBCUTANEOUS at 08:56

## 2018-07-28 RX ADMIN — Medication 1000 MILLIGRAM(S): at 13:36

## 2018-07-28 RX ADMIN — Medication 400 MILLIGRAM(S): at 08:57

## 2018-07-28 RX ADMIN — Medication 100 GRAM(S): at 08:57

## 2018-07-28 RX ADMIN — Medication 400 MILLIGRAM(S): at 01:50

## 2018-07-28 RX ADMIN — Medication 100 GRAM(S): at 18:08

## 2018-07-28 NOTE — PROGRESS NOTE ADULT - SUBJECTIVE AND OBJECTIVE BOX
SUBJECTIVE    Nausea [ ] YES [X ] NO  Vomiting [ ] YES [X ] NO  Voiding normally [X ] YES [ ] NO  Flatus [X ] YES [ ] NO  Pain under control [X ] YES [ ] NO  NPO  Ambulated [X ] YES NO [ ]  Using Incentive Spirometer [ ] YES [ ] NO      VITALS    ICU Vital Signs Last 24 Hrs  T(C): 36.7 (28 Jul 2018 06:00), Max: 37 (27 Jul 2018 16:45)  T(F): 98.1 (28 Jul 2018 06:00), Max: 98.6 (27 Jul 2018 16:45)  HR: 68 (28 Jul 2018 06:00) (66 - 81)  BP: 100/63 (28 Jul 2018 06:00) (100/63 - 148/83)  BP(mean): 101 (27 Jul 2018 15:42) (93 - 101)  ABP: --  ABP(mean): --  RR: 18 (28 Jul 2018 06:00) (16 - 18)  SpO2: 100% (28 Jul 2018 06:00) (94% - 100%)    I&O's Detail    27 Jul 2018 07:01  -  28 Jul 2018 07:00  --------------------------------------------------------  IN:    Lactated Ringers IV Bolus: 2000 mL  Total IN: 2000 mL    OUT:    Bulb: 85 mL  Total OUT: 85 mL    Total NET: 1915 mL            PHYSICAL EXAMINATION    Abdomen: soft, appropriately tender to Kocher incision; CASTILLO: serosanguinous    I&O's Summary    27 Jul 2018 07:01  -  28 Jul 2018 07:00  --------------------------------------------------------  IN: 2000 mL / OUT: 85 mL / NET: 1915 mL        LABS                        11.9   6.5   )-----------( 210      ( 28 Jul 2018 05:54 )             34.8             07-28    138  |  105  |  11  ----------------------------<  97  3.6   |  29  |  0.66    Ca    7.6<L>      28 Jul 2018 05:54    TPro  5.6<L>  /  Alb  2.3<L>  /  TBili  1.3<H>  /  DBili  x   /  AST  83<H>  /  ALT  100<H>  /  AlkPhos  156<H>  07-28    LIVER FUNCTIONS - ( 28 Jul 2018 05:54 )  Alb: 2.3 g/dL / Pro: 5.6 g/dL / ALK PHOS: 156 U/L / ALT: 100 U/L DA / AST: 83 U/L / GGT: x             MEDICATIONS:  MEDICATIONS  (STANDING):  acetaminophen  IVPB. 1000 milliGRAM(s) IV Intermittent once  cefoTEtan  IVPB 2 Gram(s) IV Intermittent every 12 hours  heparin  Injectable 5000 Unit(s) SubCutaneous every 8 hours  sodium chloride 0.9%. 1000 milliLiter(s) (125 mL/Hr) IV Continuous <Continuous>    MEDICATIONS  (PRN):  HYDROmorphone  Injectable 1 milliGRAM(s) IV Push every 6 hours PRN Severe Pain (7 - 10)  morphine  - Injectable 2 milliGRAM(s) IV Push every 6 hours PRN Severe Pain (7 - 10)  ondansetron Injectable 4 milliGRAM(s) IV Push every 6 hours PRN Nausea and/or Vomiting  oxyCODONE    5 mG/acetaminophen 325 mG 1 Tablet(s) Oral every 4 hours PRN Moderate Pain

## 2018-07-28 NOTE — PROGRESS NOTE ADULT - ASSESSMENT
58M s./p lap to open cholecystectomy with normal cholangiogram. Afebrile, stable.    1) advance diet  2) continue abx  3) labs acceptable  4) anticipate D/C tomorrow if tolerates diet and labs remain normal

## 2018-07-29 ENCOUNTER — TRANSCRIPTION ENCOUNTER (OUTPATIENT)
Age: 58
End: 2018-07-29

## 2018-07-29 VITALS
SYSTOLIC BLOOD PRESSURE: 137 MMHG | DIASTOLIC BLOOD PRESSURE: 76 MMHG | TEMPERATURE: 98 F | RESPIRATION RATE: 17 BRPM | HEART RATE: 75 BPM | OXYGEN SATURATION: 96 %

## 2018-07-29 LAB
ANION GAP SERPL CALC-SCNC: 7 MMOL/L — SIGNIFICANT CHANGE UP (ref 5–17)
BUN SERPL-MCNC: 7 MG/DL — SIGNIFICANT CHANGE UP (ref 7–18)
CALCIUM SERPL-MCNC: 7.7 MG/DL — LOW (ref 8.4–10.5)
CHLORIDE SERPL-SCNC: 105 MMOL/L — SIGNIFICANT CHANGE UP (ref 96–108)
CO2 SERPL-SCNC: 26 MMOL/L — SIGNIFICANT CHANGE UP (ref 22–31)
CREAT SERPL-MCNC: 0.93 MG/DL — SIGNIFICANT CHANGE UP (ref 0.5–1.3)
GLUCOSE SERPL-MCNC: 103 MG/DL — HIGH (ref 70–99)
HCT VFR BLD CALC: 36.4 % — LOW (ref 39–50)
HGB BLD-MCNC: 12.3 G/DL — LOW (ref 13–17)
MCHC RBC-ENTMCNC: 32.1 PG — SIGNIFICANT CHANGE UP (ref 27–34)
MCHC RBC-ENTMCNC: 33.8 GM/DL — SIGNIFICANT CHANGE UP (ref 32–36)
MCV RBC AUTO: 94.8 FL — SIGNIFICANT CHANGE UP (ref 80–100)
PLATELET # BLD AUTO: 255 K/UL — SIGNIFICANT CHANGE UP (ref 150–400)
POTASSIUM SERPL-MCNC: 3.4 MMOL/L — LOW (ref 3.5–5.3)
POTASSIUM SERPL-SCNC: 3.4 MMOL/L — LOW (ref 3.5–5.3)
RBC # BLD: 3.84 M/UL — LOW (ref 4.2–5.8)
RBC # FLD: 11.1 % — SIGNIFICANT CHANGE UP (ref 10.3–14.5)
SODIUM SERPL-SCNC: 138 MMOL/L — SIGNIFICANT CHANGE UP (ref 135–145)
WBC # BLD: 7.5 K/UL — SIGNIFICANT CHANGE UP (ref 3.8–10.5)
WBC # FLD AUTO: 7.5 K/UL — SIGNIFICANT CHANGE UP (ref 3.8–10.5)

## 2018-07-29 PROCEDURE — 36415 COLL VENOUS BLD VENIPUNCTURE: CPT

## 2018-07-29 PROCEDURE — 85610 PROTHROMBIN TIME: CPT

## 2018-07-29 PROCEDURE — 80048 BASIC METABOLIC PNL TOTAL CA: CPT

## 2018-07-29 PROCEDURE — 86901 BLOOD TYPING SEROLOGIC RH(D): CPT

## 2018-07-29 PROCEDURE — 81001 URINALYSIS AUTO W/SCOPE: CPT

## 2018-07-29 PROCEDURE — 85730 THROMBOPLASTIN TIME PARTIAL: CPT

## 2018-07-29 PROCEDURE — 86850 RBC ANTIBODY SCREEN: CPT

## 2018-07-29 PROCEDURE — 93005 ELECTROCARDIOGRAM TRACING: CPT

## 2018-07-29 PROCEDURE — 80053 COMPREHEN METABOLIC PANEL: CPT

## 2018-07-29 PROCEDURE — 99285 EMERGENCY DEPT VISIT HI MDM: CPT | Mod: 25

## 2018-07-29 PROCEDURE — 76000 FLUOROSCOPY <1 HR PHYS/QHP: CPT

## 2018-07-29 PROCEDURE — 86900 BLOOD TYPING SEROLOGIC ABO: CPT

## 2018-07-29 PROCEDURE — 74177 CT ABD & PELVIS W/CONTRAST: CPT

## 2018-07-29 PROCEDURE — 76705 ECHO EXAM OF ABDOMEN: CPT

## 2018-07-29 PROCEDURE — 88304 TISSUE EXAM BY PATHOLOGIST: CPT

## 2018-07-29 PROCEDURE — 83690 ASSAY OF LIPASE: CPT

## 2018-07-29 PROCEDURE — 85027 COMPLETE CBC AUTOMATED: CPT

## 2018-07-29 PROCEDURE — 82962 GLUCOSE BLOOD TEST: CPT

## 2018-07-29 RX ORDER — POTASSIUM CHLORIDE 20 MEQ
20 PACKET (EA) ORAL ONCE
Qty: 0 | Refills: 0 | Status: COMPLETED | OUTPATIENT
Start: 2018-07-29 | End: 2018-07-29

## 2018-07-29 RX ORDER — METRONIDAZOLE 500 MG
1 TABLET ORAL
Qty: 21 | Refills: 0 | OUTPATIENT
Start: 2018-07-29 | End: 2018-08-04

## 2018-07-29 RX ORDER — MOXIFLOXACIN HYDROCHLORIDE TABLETS, 400 MG 400 MG/1
1 TABLET, FILM COATED ORAL
Qty: 14 | Refills: 0 | OUTPATIENT
Start: 2018-07-29 | End: 2018-08-04

## 2018-07-29 RX ADMIN — OXYCODONE AND ACETAMINOPHEN 1 TABLET(S): 5; 325 TABLET ORAL at 01:00

## 2018-07-29 RX ADMIN — Medication 100 GRAM(S): at 05:15

## 2018-07-29 RX ADMIN — HEPARIN SODIUM 5000 UNIT(S): 5000 INJECTION INTRAVENOUS; SUBCUTANEOUS at 00:14

## 2018-07-29 RX ADMIN — HEPARIN SODIUM 5000 UNIT(S): 5000 INJECTION INTRAVENOUS; SUBCUTANEOUS at 08:00

## 2018-07-29 RX ADMIN — Medication 20 MILLIEQUIVALENT(S): at 09:54

## 2018-07-29 RX ADMIN — OXYCODONE AND ACETAMINOPHEN 1 TABLET(S): 5; 325 TABLET ORAL at 06:00

## 2018-07-29 RX ADMIN — OXYCODONE AND ACETAMINOPHEN 1 TABLET(S): 5; 325 TABLET ORAL at 05:18

## 2018-07-29 RX ADMIN — OXYCODONE AND ACETAMINOPHEN 1 TABLET(S): 5; 325 TABLET ORAL at 00:13

## 2018-07-29 NOTE — PROGRESS NOTE ADULT - SUBJECTIVE AND OBJECTIVE BOX
SUBJECTIVE    Nausea [ ] YES [X ] NO  Vomiting [ ] YES [X ] NO  Voiding normally X[ ] YES [ ] NO  Flatus [X ] YES [ ] NO  Pain under control [ X] YES [ ] NO  Tolerating regular diet  Ambulated [X ] YES NO [ ]--feels a lot better  Using Incentive Spirometer [X ] YES [ ] NO      VITALS    ICU Vital Signs Last 24 Hrs  T(C): 36.8 (29 Jul 2018 05:16), Max: 37.2 (28 Jul 2018 14:00)  T(F): 98.2 (29 Jul 2018 05:16), Max: 98.9 (28 Jul 2018 14:00)  HR: 75 (29 Jul 2018 05:16) (75 - 82)  BP: 137/76 (29 Jul 2018 05:16) (128/66 - 137/76)  BP(mean): --  ABP: --  ABP(mean): --  RR: 17 (29 Jul 2018 05:16) (17 - 18)  SpO2: 96% (29 Jul 2018 05:16) (92% - 100%)    I&O's Detail    28 Jul 2018 07:01  -  29 Jul 2018 07:00  --------------------------------------------------------  IN:  Total IN: 0 mL    OUT:    Bulb: 50 mL  Total OUT: 50 mL    Total NET: -50 mL            PHYSICAL EXAMINATION    Abdomen: soft, ND, very mildly tender to RUQ; incision without erythema, drainage or induration; CASTILLO serosanguinous; laparoscopic port sites are also clear and staples are present    I&O's Summary    28 Jul 2018 07:01  -  29 Jul 2018 07:00  --------------------------------------------------------  IN: 0 mL / OUT: 50 mL / NET: -50 mL        LABS                        12.3   7.5   )-----------( 255      ( 29 Jul 2018 06:43 )             36.4             07-29    138  |  105  |  7   ----------------------------<  103<H>  3.4<L>   |  26  |  0.93    Ca    7.7<L>      29 Jul 2018 06:43    TPro  5.6<L>  /  Alb  2.3<L>  /  TBili  1.3<H>  /  DBili  x   /  AST  83<H>  /  ALT  100<H>  /  AlkPhos  156<H>  07-28    LIVER FUNCTIONS - ( 28 Jul 2018 05:54 )  Alb: 2.3 g/dL / Pro: 5.6 g/dL / ALK PHOS: 156 U/L / ALT: 100 U/L DA / AST: 83 U/L / GGT: x             MEDICATIONS:  MEDICATIONS  (STANDING):  cefoTEtan  IVPB 2 Gram(s) IV Intermittent every 12 hours  heparin  Injectable 5000 Unit(s) SubCutaneous every 8 hours  potassium chloride    Tablet ER 20 milliEquivalent(s) Oral once  sodium chloride 0.9%. 1000 milliLiter(s) (125 mL/Hr) IV Continuous <Continuous>    MEDICATIONS  (PRN):  HYDROmorphone  Injectable 1 milliGRAM(s) IV Push every 6 hours PRN Severe Pain (7 - 10)  morphine  - Injectable 2 milliGRAM(s) IV Push every 6 hours PRN Severe Pain (7 - 10)  ondansetron Injectable 4 milliGRAM(s) IV Push every 6 hours PRN Nausea and/or Vomiting  oxyCODONE    5 mG/acetaminophen 325 mG 1 Tablet(s) Oral every 4 hours PRN Moderate Pain

## 2018-07-29 NOTE — DISCHARGE NOTE ADULT - MEDICATION SUMMARY - MEDICATIONS TO TAKE
I will START or STAY ON the medications listed below when I get home from the hospital:    Flagyl 500 mg oral tablet  -- 1 tab(s) by mouth 3 times a day   -- Do not drink alcoholic beverages when taking this medication.  Finish all this medication unless otherwise directed by prescriber.  May discolor urine or feces.    -- Indication: For infection ppx     oxyCODONE-acetaminophen 5 mg-325 mg oral tablet  -- 1 tab(s) by mouth every 6 hours, As Needed -Moderate Pain MDD:4  -- Indication: For Pain     Silvadene 1% topical cream  -- Apply on skin to affected area 2 times a day   -- For external use only.    -- Indication: For As directed     Cipro 500 mg oral tablet  -- 1 tab(s) by mouth every 12 hours   -- Avoid prolonged or excessive exposure to direct and/or artificial sunlight while taking this medication.  Check with your doctor before becoming pregnant.  Do not take dairy products, antacids, or iron preparations within one hour of this medication.  Finish all this medication unless otherwise directed by prescriber.  Medication should be taken with plenty of water.    -- Indication: For infection ppx

## 2018-07-29 NOTE — DISCHARGE NOTE ADULT - HOSPITAL COURSE
57 y/o M with no significant PMH presents with 4 days of 8/10 intermittent abdominal pain in the RUQ first experienced after eating pork this past Sunday and was associated with nausea. Pain has been constant since yesterday with one episode of vomiting. Current pain 3/10. Has not had bowel movement in 4 days. Denies fever, chills, HA, diarrhea, CP, SOB. Denies prior history of gallbladder disease and cholelithiasis.   Pt admitted to surgical services, underwent laparoscopic converted to open cholecystectomy. Post operative period uncomplicated, pt tolerating diet, stable foe discharge home. Pt to follow up in office with Dr Soriano.

## 2018-07-29 NOTE — PROGRESS NOTE ADULT - ASSESSMENT
58M s/p lap to open cholecystectomy. Doing well.    1) D/C CASTILLO drain--DONE  2) D/C with cipro flagyl for 1 week

## 2018-07-29 NOTE — DISCHARGE NOTE ADULT - CARE PLAN
Principal Discharge DX:	Acute cholecystitis  Goal:	wound healing  Assessment and plan of treatment:	Follow up with Dr. Soriano in 1-2 weeks in office. Eat healthy and well balanced diet.

## 2018-07-29 NOTE — DISCHARGE NOTE ADULT - PLAN OF CARE
wound healing Follow up with Dr. Soriano in 1-2 weeks in office. Eat healthy and well balanced diet.

## 2018-07-29 NOTE — PROGRESS NOTE ADULT - ASSESSMENT
57 y/o Male s/p open cholecystectomy POD#2, hypokalemia       -Reg diet   -C/w Abx   -Replace K levels   -Dc CASTILLO drain   -OOB/Ambulate   -DVT ppx  -Dc home today

## 2018-07-29 NOTE — DISCHARGE NOTE ADULT - PATIENT PORTAL LINK FT
You can access the [facility] Patient Portal, offered by [facility], by registering with the following website: [email or web address]

## 2018-07-29 NOTE — PROGRESS NOTE ADULT - SUBJECTIVE AND OBJECTIVE BOX
INTERVAL HPI/OVERNIGHT EVENTS:    Pt s/p Open cholecystectomy, POD#2, seen and examined at bedside, ststes pain improved, denies nausea or vomiting. On reg diet tolerating well.      Vital Signs Last 24 Hrs  T(C): 36.8 (29 Jul 2018 05:16), Max: 37.2 (28 Jul 2018 14:00)  T(F): 98.2 (29 Jul 2018 05:16), Max: 98.9 (28 Jul 2018 14:00)  HR: 75 (29 Jul 2018 05:16) (75 - 82)  BP: 137/76 (29 Jul 2018 05:16) (128/66 - 137/76)  BP(mean): --  RR: 17 (29 Jul 2018 05:16) (17 - 18)  SpO2: 96% (29 Jul 2018 05:16) (92% - 100%)  I&O's Detail    28 Jul 2018 07:01  -  29 Jul 2018 07:00  --------------------------------------------------------  IN:  Total IN: 0 mL    OUT:    Bulb: 50 mL  Total OUT: 50 mL    Total NET: -50 mL        cefoTEtan  IVPB 2 Gram(s) IV Intermittent every 12 hours      Physical Exam  General: AAOx3, No acute distress  Skin: No jaundice, no icterus  Abdomen: soft, nondistended, min incisional TTP, RUQ wound w/ staples in place, no active drainage, Hank in place, ss output  Extremities: non edematous, no calf pain bilaterally      Labs:                        12.3   7.5   )-----------( 255      ( 29 Jul 2018 06:43 )             36.4     07-29    138  |  105  |  7   ----------------------------<  103<H>  3.4<L>   |  26  |  0.93    Ca    7.7<L>      29 Jul 2018 06:43    TPro  5.6<L>  /  Alb  2.3<L>  /  TBili  1.3<H>  /  DBili  x   /  AST  83<H>  /  ALT  100<H>  /  AlkPhos  156<H>  07-28

## 2018-08-04 ENCOUNTER — EMERGENCY (EMERGENCY)
Facility: HOSPITAL | Age: 58
LOS: 1 days | Discharge: HOME | End: 2018-08-04
Attending: EMERGENCY MEDICINE
Payer: MEDICAID

## 2018-08-04 VITALS — HEIGHT: 64 IN | WEIGHT: 158.95 LBS

## 2018-08-04 VITALS
TEMPERATURE: 98 F | HEART RATE: 80 BPM | SYSTOLIC BLOOD PRESSURE: 100 MMHG | RESPIRATION RATE: 16 BRPM | OXYGEN SATURATION: 97 % | DIASTOLIC BLOOD PRESSURE: 64 MMHG

## 2018-08-04 DIAGNOSIS — Z90.49 ACQUIRED ABSENCE OF OTHER SPECIFIED PARTS OF DIGESTIVE TRACT: Chronic | ICD-10-CM

## 2018-08-04 PROCEDURE — 99283 EMERGENCY DEPT VISIT LOW MDM: CPT

## 2018-08-04 RX ORDER — DIPHENHYDRAMINE HCL 50 MG
50 CAPSULE ORAL ONCE
Qty: 0 | Refills: 0 | Status: COMPLETED | OUTPATIENT
Start: 2018-08-04 | End: 2018-08-04

## 2018-08-04 RX ORDER — DIPHENHYDRAMINE HCL 50 MG
1 CAPSULE ORAL
Qty: 20 | Refills: 0 | OUTPATIENT
Start: 2018-08-04

## 2018-08-04 RX ADMIN — Medication 50 MILLIGRAM(S): at 14:47

## 2018-08-04 RX ADMIN — Medication 50 MILLIGRAM(S): at 14:49

## 2018-08-04 NOTE — ED ADULT NURSE NOTE - OBJECTIVE STATEMENT
pt from home c/o of generalized rash all over body x3 days pt is alert awake oriented x3 reports "I had surgery 1 week ago and now on po antibiotic" denies any hx of allergy to drugs

## 2018-08-04 NOTE — ED PROVIDER NOTE - OBJECTIVE STATEMENT
57 y/o M pt s/p cholecystectomy on 07/27/2018 presents to ED c/o itchiness and rash to extremities and torso x yesterday. Patient denies change in mediations since being discharged. Patient denies difficulty breathing, throat pain, throat itchiness, nausea, vomiting, or any other complaints. NKDA.

## 2018-08-04 NOTE — ED ADULT NURSE NOTE - NSIMPLEMENTINTERV_GEN_ALL_ED
Implemented All Universal Safety Interventions:  [place] to call system. Call bell, personal items and telephone within reach. Instruct patient to call for assistance. Room bathroom lighting operational. Non-slip footwear when patient is off stretcher. Physically safe environment: no spills, clutter or unnecessary equipment. Stretcher in lowest position, wheels locked, appropriate side rails in place.

## 2018-08-04 NOTE — ED PROVIDER NOTE - PROGRESS NOTE DETAILS
Discussed with Dr. Gibbs, who states prednisone is fine and pt can follow up with Dr. Albrecht in the office. Pt feels better after medications. Will discharge to home with outpatient follow up.

## 2018-08-04 NOTE — ED PROVIDER NOTE - SKIN LOCATION #1
mild hives noted to extremities. Also, surgical incision site C/D/I without surrounding erythema or induration.

## 2018-08-07 PROBLEM — Z00.00 ENCOUNTER FOR PREVENTIVE HEALTH EXAMINATION: Status: ACTIVE | Noted: 2018-08-07

## 2018-08-08 ENCOUNTER — APPOINTMENT (OUTPATIENT)
Dept: SURGERY | Facility: CLINIC | Age: 58
End: 2018-08-08
Payer: SELF-PAY

## 2018-08-08 VITALS
OXYGEN SATURATION: 98 % | BODY MASS INDEX: 23.9 KG/M2 | WEIGHT: 140 LBS | TEMPERATURE: 98.6 F | DIASTOLIC BLOOD PRESSURE: 72 MMHG | HEART RATE: 64 BPM | HEIGHT: 64 IN | SYSTOLIC BLOOD PRESSURE: 110 MMHG

## 2018-08-08 DIAGNOSIS — K81.0 ACUTE CHOLECYSTITIS: ICD-10-CM

## 2018-08-08 PROCEDURE — 99024 POSTOP FOLLOW-UP VISIT: CPT

## 2018-08-08 RX ORDER — METRONIDAZOLE 500 MG/1
500 TABLET ORAL
Refills: 0 | Status: ACTIVE | COMMUNITY

## 2018-08-08 RX ORDER — OXYCODONE AND ACETAMINOPHEN 5; 325 MG/1; MG/1
5-325 TABLET ORAL
Refills: 0 | Status: ACTIVE | COMMUNITY

## 2018-08-08 RX ORDER — PREDNISONE 20 MG/1
20 TABLET ORAL
Refills: 0 | Status: ACTIVE | COMMUNITY

## 2018-08-08 RX ORDER — CIPROFLOXACIN HYDROCHLORIDE 500 MG/1
500 TABLET, FILM COATED ORAL
Refills: 0 | Status: ACTIVE | COMMUNITY

## 2018-08-08 RX ORDER — DIPHENHYDRAMINE HYDROCHLORIDE 25 MG/1
25 CAPSULE ORAL
Refills: 0 | Status: ACTIVE | COMMUNITY

## 2018-08-16 ENCOUNTER — APPOINTMENT (OUTPATIENT)
Dept: SURGERY | Facility: CLINIC | Age: 58
End: 2018-08-16

## 2018-08-16 PROBLEM — Z53.31 LAPAROSCOPIC SURGICAL PROCEDURE CONVERTED TO OPEN PROCEDURE: Status: ACTIVE | Noted: 2018-08-14

## 2018-08-23 ENCOUNTER — APPOINTMENT (OUTPATIENT)
Dept: SURGERY | Facility: CLINIC | Age: 58
End: 2018-08-23

## 2018-08-23 DIAGNOSIS — Z53.31: ICD-10-CM

## 2018-08-29 ENCOUNTER — APPOINTMENT (OUTPATIENT)
Dept: SURGERY | Facility: CLINIC | Age: 58
End: 2018-08-29

## 2020-06-24 NOTE — PROGRESS NOTE ADULT - SUBJECTIVE AND OBJECTIVE BOX
Post Op    Pt c/o mild incisional pain  Denies N/V, fever/chills  Tolerating CLD  +voiding    PE: comfortable    Vital Signs Last 24 Hrs  T(C): 36.6 (27 Jul 2018 21:20), Max: 37.1 (26 Jul 2018 22:23)  T(F): 97.8 (27 Jul 2018 21:20), Max: 98.7 (26 Jul 2018 22:23)  HR: 74 (27 Jul 2018 21:20) (68 - 83)  BP: 119/83 (27 Jul 2018 21:20) (119/83 - 148/83)  BP(mean): 101 (27 Jul 2018 15:42) (93 - 101)  RR: 18 (27 Jul 2018 21:20) (16 - 18)  SpO2: 97% (27 Jul 2018 21:20) (94% - 98%)    Chest: B/l BS, decreased at bases  CVS: S1S2, RRR  Abd: soft, ND, +BS, dressing with some dry stains, no active bleeding, CASTILLO with non-bilious ss drainage  Ext: no calf tenderness or rigidity    I&O's Detail    27 Jul 2018 07:01  -  27 Jul 2018 21:51  --------------------------------------------------------  IN:    Lactated Ringers IV Bolus: 2000 mL  Total IN: 2000 mL    OUT:    Bulb: 20 mL  Total OUT: 20 mL    Total NET: 1980 mL      MEDICATIONS  (STANDING):  cefoTEtan  IVPB 2 Gram(s) IV Intermittent every 12 hours  sodium chloride 0.9%. 1000 milliLiter(s) (125 mL/Hr) IV Continuous <Continuous>    MEDICATIONS  (PRN):  HYDROmorphone  Injectable 1 milliGRAM(s) IV Push every 6 hours PRN Severe Pain (7 - 10)  morphine  - Injectable 2 milliGRAM(s) IV Push every 6 hours PRN Severe Pain (7 - 10)  ondansetron Injectable 4 milliGRAM(s) IV Push every 6 hours PRN Nausea and/or Vomiting  oxyCODONE    5 mG/acetaminophen 325 mG 1 Tablet(s) Oral every 4 hours PRN Moderate Pain Rhombic Flap Text: The defect edges were debeveled with a #15 scalpel blade.  Given the location of the defect and the proximity to free margins a rhombic flap was deemed most appropriate.  Using a sterile surgical marker, an appropriate rhombic flap was drawn incorporating the defect.    The area thus outlined was incised deep to adipose tissue with a #15 scalpel blade.  The skin margins were undermined to an appropriate distance in all directions utilizing iris scissors.

## 2021-03-15 NOTE — ED ADULT TRIAGE NOTE - CCCP TRG CHIEF CMPLNT
Spoke with patient made him aware that it has been faxed over. Patient thankful for the call back.    burns

## 2021-06-17 NOTE — ED ADULT NURSE NOTE - MODE OF DISCHARGE
Patient Instructions by Ayala Joseph MD at 11/14/2019  8:45 AM     Author: Ayala Joseph MD Service: -- Author Type: Physician    Filed: 11/14/2019  8:58 AM Encounter Date: 11/14/2019 Status: Signed    : Ayala Joseph MD (Physician)       Patient Education     Foot Surgery: Neuroma or Plantar Callus  Tight shoes and high heels can place extra pressure on the ball of your foot, causing neuromas and calluses. A neuroma is an inflamed nerve. It can cause pain, numbness, or burning. A plantar callus is a buildup of hard skin on the ball of the foot. The callus may feel like a stone in your shoe.  There are many nonsurgical treatments for neuromas and calluses. But if these are not helpful, surgery may be considered.    Neuroma  When 2 metatarsal bones are squeezed together, they may pinch the nerve that runs between them. The pinched nerve can become swollen and painful. This often happens at the base of the third and the fourth toes. Standing or walking for a while can increase the pain.  Plantar callus  When one metatarsal bone is longer or lower than the others, it presses on the skin beneath, forming a callus. Wearing shoes with thin soles and high heels can also place extra pressure on the ball of your foot. As a result, the callus may cause foot pain and irritation.  Neuroma removal  The enlarged part of the inflamed nerve is removed. Most often, you can bear weight on your foot right away. You may have to wear a surgical shoe for a few weeks. When healed, a small area may feel numb, where part of the nerve was taken out.    Bone removal  The affected metatarsal bone is cut and aligned with the other metatarsals (oblique osteotomy). Screws or pins may be used to hold the bone in place. Only part of the metatarsal bone is removed. The plantar callus should go away on its own over time.  Date Last Reviewed: 5/1/2018 2000-2019 [employer]. [address] [place] [address],  [place] [address]. All rights reserved. This information is not intended as a substitute for professional medical care. Always follow your healthcare professional's instructions.         Patient Education     What Are Neuromas of the Foot?  The ball of your foot is the bottom part just behind your toes. Bands of tissue (ligaments) connect the bones in the ball of your foot. Nerves run between the bones and underneath the ligaments. When a nerve or a number of small nerves become pinched, this causes them to swell and become painful due to the thickening of the tissue that surrounds the nerves. The painful, swollen nerve or cluster of nerves is called a neuroma (also called Yoon neuroma).     A neuroma most often happens at the base of either the third and fourth toes or the second and third toes.   What causes a neuroma?  Runners often report this problem and describe experiencing pain as they push off from the starting block. Wearing tight or high-heeled shoes can cause a neuroma. Shoes that are too narrow or too pointed squeeze the bones in the ball of the foot. Shoes with high heels put extra pressure on the ends of the bones. When the bones are squeezed together, they pinch the nerve that runs between them.  Symptoms  The most common symptom of a neuroma is pain in the ball of the foot between two toes. The pain may be dull or sharp. It may feel as if you have a stone in your shoe. You may also have tingling or numbness in one or both of the toes. Symptoms may happen after you have been walking or standing for a while. Taking off your shoes and rubbing the ball of your foot may decrease or relieve the pain.  Preventing future problems  To prevent a future neuroma, buy shoes with plenty of room across the ball of the foot and in the toes. This keeps the bones from being squeezed together. Wearing low-heeled shoes (less than 2 inches) also puts less pressure on the bones and nerves in the ball of the foot.  Date  Last Reviewed: 10/1/2017    7340-7395 [employer]. [address] [place] [address], [place] [address]. All rights reserved. This information is not intended as a substitute for professional medical care. Always follow your healthcare professional's instructions.                 Ambulatory

## 2024-07-29 NOTE — ED ADULT NURSE NOTE - RESPIRATORY WDL
Applied low fiber diet Breathing spontaneous and unlabored. Breath sounds clear and equal bilaterally with regular rhythm.

## 2025-06-05 NOTE — ED ADULT NURSE NOTE - NSFALLRSKINDICATORS_ED_ALL_ED
Called patient, no answer, LVM to return call to schedule a post-op appointment for wound check, as she no showed her appointment yesterday  
no